# Patient Record
Sex: MALE | Race: OTHER | HISPANIC OR LATINO | URBAN - METROPOLITAN AREA
[De-identification: names, ages, dates, MRNs, and addresses within clinical notes are randomized per-mention and may not be internally consistent; named-entity substitution may affect disease eponyms.]

---

## 2024-11-17 ENCOUNTER — EMERGENCY (EMERGENCY)
Facility: HOSPITAL | Age: 24
LOS: 0 days | Discharge: HOME | End: 2024-11-17
Attending: EMERGENCY MEDICINE
Payer: COMMERCIAL

## 2024-11-17 VITALS
HEART RATE: 75 BPM | TEMPERATURE: 98 F | DIASTOLIC BLOOD PRESSURE: 67 MMHG | OXYGEN SATURATION: 99 % | RESPIRATION RATE: 18 BRPM | SYSTOLIC BLOOD PRESSURE: 110 MMHG

## 2024-11-17 VITALS — HEIGHT: 65 IN

## 2024-11-17 DIAGNOSIS — S61.012D LACERATION WITHOUT FOREIGN BODY OF LEFT THUMB WITHOUT DAMAGE TO NAIL, SUBSEQUENT ENCOUNTER: ICD-10-CM

## 2024-11-17 PROCEDURE — L9995: CPT

## 2024-11-17 PROCEDURE — 99212 OFFICE O/P EST SF 10 MIN: CPT

## 2024-11-17 NOTE — ED STATDOCS - NSFOLLOWUPINSTRUCTIONS_ED_ALL_ED_FT
Follow up with your PCP.  Soak thumb and apply bacitracin.   Return to the ED for new or worsening symptoms.    Stitches Removal    AMBULATORY CARE:    What you need to know about stitches removal: Stitches are usually removed within 14 days, depending on the location of the wound. Your healthcare provider will tell you when to return to have your stitches removed. Your provider will use sterile forceps or tweezers to  the knot of each stitch. He or she will cut the stitch with scissors and pull the stitch out. You may feel a slight tug as the stitch comes out.    Seek care immediately if:    Your wound splits open or is starting to come apart.    You suddenly cannot move your injured joint.    You have sudden numbness around your wound.    You see red streaks coming from your wound.  Contact your healthcare provider if:    You have a fever and chills.    Your wound is red, warm, swollen, or leaking pus.    There is a bad smell coming from your wound.    You have increased pain in the wound area.    You have questions or concerns about your condition or care.  Care for the area after the stitches are removed:    Do not pull medical tape off. Your provider may place small strips of medical tape across your wound after the stitches have been removed. These strips will peel and fall of on their own. Do not pull them off.    Clean the area as directed. Carefully wash the area with soap and water. Pat the area dry with a clean towel. Check the area for signs of infection, such as redness, swelling, or pus. Also check that the wound is not coming apart.    Protect your wound. Your wound can swell, bleed, or split open if it is stretched or bumped. You may need to wear a bandage that supports your wound until it is completely healed.    Care for a scar. You may have a scar after the stitches are removed. Use sunblock if the area is exposed to the sun. Apply it every day after the stitches are removed. This will help prevent skin discoloration. Talk to your healthcare provider about medicines you can use to make the scar less visible. Some medicines are available without a prescription.  Follow up with your healthcare provider as directed: You may need to return in 3 to 5 days if the stitches are on your face. Stitches on your scalp need to be removed after 7 to 14 days. Stitches over joints may remain in place up to 14 days. Write down your questions so you remember to ask them during your visits.

## 2024-11-17 NOTE — ED STATDOCS - OBJECTIVE STATEMENT
25 y/o male with no pertinent PMHx presenting to the ED for suture removal. Pt sustained a laceration to his left thumb on 11/7 on a saw at work. Pt denies any fevers and has no other medical complaints.

## 2024-11-17 NOTE — ED STATDOCS - PHYSICAL EXAMINATION
Constitutional: NAD AOx3  Eyes: PERRL EOMI  Head: Normocephalic atraumatic  Mouth: MMM  Cardiac: regular rate and rhythm  Resp: Lungs CTAB  GI: Abd s/nd/nt  Neuro: CN2-12 grossly intact, ESCALERA x 4  Skin: No visible rashes, left thumb fingertip  has sutures with scab overlying, no discharge, no erythema.

## 2024-11-17 NOTE — ED STATDOCS - PATIENT PORTAL LINK FT
You can access the FollowMyHealth Patient Portal offered by [facility] by registering at the following website: [email or web address]. By joining [employer]’s FollowMyHealth portal, you will also be able to view your health information using other applications (apps) compatible with our system.

## 2024-11-17 NOTE — ED STATDOCS - CARE PLAN
1 Principal Discharge DX:	Encounter for removal of sutures  Secondary Diagnosis:	Laceration of left thumb

## 2024-11-17 NOTE — ED STATDOCS - PROGRESS NOTE DETAILS
24-year-old male with no pertinent past medical history presents emergency department for removal of sutures.  Patient sustained laceration to left thumb 10 days ago with 5 sutures placed.  No complaints at this time.  Vitals are stable.  PE demonstrates tip of left thumb 5 sutures intact with overlying scab, no signs infection.  Sutures removed by myself, patient tolerated procedure well.  Still with scabbing unable to be removed with scraping.  Discussed soaking the finger and using bacitracin over-the-counter.  Stable for discharge home.  Strict return precautions were discussed.  All question concerns were addressed. -  Katie Branch PA-C
